# Patient Record
Sex: MALE | Race: WHITE | NOT HISPANIC OR LATINO | Employment: FULL TIME | URBAN - METROPOLITAN AREA
[De-identification: names, ages, dates, MRNs, and addresses within clinical notes are randomized per-mention and may not be internally consistent; named-entity substitution may affect disease eponyms.]

---

## 2017-01-16 ENCOUNTER — PATIENT MESSAGE (OUTPATIENT)
Dept: FAMILY MEDICINE | Facility: CLINIC | Age: 47
End: 2017-01-16

## 2017-01-16 DIAGNOSIS — F11.20 OPIOID DEPENDENCE ON AGONIST THERAPY: ICD-10-CM

## 2017-01-17 ENCOUNTER — PATIENT MESSAGE (OUTPATIENT)
Dept: FAMILY MEDICINE | Facility: CLINIC | Age: 47
End: 2017-01-17

## 2017-02-03 ENCOUNTER — OFFICE VISIT (OUTPATIENT)
Dept: FAMILY MEDICINE | Facility: CLINIC | Age: 47
End: 2017-02-03
Payer: COMMERCIAL

## 2017-02-03 VITALS
BODY MASS INDEX: 34.27 KG/M2 | HEART RATE: 75 BPM | RESPIRATION RATE: 16 BRPM | WEIGHT: 205.94 LBS | SYSTOLIC BLOOD PRESSURE: 120 MMHG | DIASTOLIC BLOOD PRESSURE: 88 MMHG | OXYGEN SATURATION: 96 %

## 2017-02-03 DIAGNOSIS — F11.20 OPIOID DEPENDENCE ON AGONIST THERAPY: Primary | ICD-10-CM

## 2017-02-03 PROCEDURE — 99999 PR PBB SHADOW E&M-EST. PATIENT-LVL III: CPT | Mod: PBBFAC,,,

## 2017-02-03 PROCEDURE — 99214 OFFICE O/P EST MOD 30 MIN: CPT | Mod: S$GLB,,,

## 2017-02-03 NOTE — PROGRESS NOTES
Subjective:       Patient ID: Jaime Henderson is a 46 y.o. male.    Chief Complaint: Drug / Alcohol Assessment    HPI The patient presents for medical management of opioid dependency. he is receiving maintenance therapy with Zubsolv. he is doing well and denies any craving or relapses. he denies any alcohol or substance abuse issues. he has had significant improvements in his relationships, social and occupational functioning. he claims to be compliant with treatment. I have reviewed his profile on the [facility] board of Pharmacy Prescription monitoring program website and he appears to be compliant. his last refill for Zubsolv  5.7 mg # 90 was on January 17, 2017. The patient understands the risks, benefits and alternatives associated with the medical management of opioid dependency with Suboxone and has signed a Behavior and Pain Agreement for the use of Controlled Drugs. He is regularly drug tested at work.     Review of Systems   Constitutional: Negative.    HENT: Negative.    Eyes: Negative.    Respiratory: Negative.  Negative for shortness of breath.    Cardiovascular: Negative.  Negative for chest pain.   Gastrointestinal: Negative.    Endocrine: Negative.    Genitourinary: Negative.    Musculoskeletal: Negative.    Skin: Negative.    Allergic/Immunologic: Negative.    Neurological: Negative.    Hematological: Negative.    Psychiatric/Behavioral: Negative.    All other systems reviewed and are negative.      Objective:      Vitals:    02/03/17 1014   BP: 120/88   Pulse: 75   Resp: 16     Physical Exam   Constitutional: He is oriented to person, place, and time. He appears well-developed and well-nourished. He is cooperative. No distress.   HENT:   Head: Normocephalic and atraumatic.   Right Ear: Hearing, tympanic membrane, external ear and ear canal normal.   Left Ear: Hearing, external ear and ear canal normal.   Nose: Nose normal.   Mouth/Throat: Oropharynx is clear and moist.   Eyes: Conjunctivae  are normal. Pupils are equal, round, and reactive to light.   Neck: Normal range of motion. Neck supple. No thyromegaly present.   Cardiovascular: Normal rate, regular rhythm, normal heart sounds and intact distal pulses.    Pulmonary/Chest: Effort normal and breath sounds normal. No respiratory distress.   Musculoskeletal: Normal range of motion. He exhibits no edema or tenderness.   Lymphadenopathy:     He has no cervical adenopathy.   Neurological: He is alert and oriented to person, place, and time. He has normal strength. Coordination and gait normal.   Skin: Skin is warm, dry and intact. No cyanosis. Nails show no clubbing.   Psychiatric: He has a normal mood and affect. His speech is normal and behavior is normal. Judgment and thought content normal. Cognition and memory are normal.   Vitals reviewed.      Assessment:       1. Opioid dependence on agonist therapy        Plan:       Opioid dependence on agonist therapy  -     buprenorphine-naloxone (ZUBSOLV) 5.7-1.4 mg Subl; Place 1 tablet under the tongue 3 (three) times daily. [id number]  Dispense: 90 tablet; Refill: 2      Return in about 3 months (around 5/3/2017).

## 2017-02-03 NOTE — MR AVS SNAPSHOT
[facility]  [address] [address]  Phone: [phone]  Fax: [phone]                  Jaime Henderson   2/3/2017 10:20 AM   Office Visit    Description:  Male : 1970   Provider:  Jak Frazier Jr., MD   Department:  [facility]           Reason for Visit     Drug / Alcohol Assessment           Diagnoses this Visit        Comments    Opioid dependence on agonist therapy    -  Primary            To Do List           Goals (5 Years of Data)     None      Follow-Up and Disposition     Return in about 3 months (around 5/3/2017).    Follow-up and Disposition History       These Medications        Disp Refills Start End    buprenorphine-naloxone (ZUBSOLV) 5.7-1.4 mg Subl 90 tablet 2 2017     Place 1 tablet under the tongue 3 (three) times daily. [id number] - Sublingual    Pharmacy: [facility] [address] Ph #: [phone]         [facility] On Call     [facility] On Call Nurse Care Line -  Assistance  Registered nurses in the [facility] On Call Center provide clinical advisement, health education, appointment booking, and other advisory services.  Call for this free service at [phone].             Medications           Message regarding Medications     Verify the changes and/or additions to your medication regime listed below are the same as discussed with your clinician today.  If any of these changes or additions are incorrect, please notify your healthcare provider.             Verify that the below list of medications is an accurate representation of the medications you are currently taking.  If none reported, the list may be blank. If incorrect, please contact your healthcare provider. Carry this list with you in case of emergency.           Current Medications     albuterol 90 mcg/actuation inhaler Inhale 2 puffs into the lungs every 6 (six) hours as needed for Wheezing.     "buprenorphine-naloxone (ZUBSOLV) 5.7-1.4 mg Subl Starting on Feb 16, 2017. Place 1 tablet under the tongue 3 (three) times daily. [id number]           Clinical Reference Information           Your Vitals Were     BP Pulse Resp Weight SpO2 BMI    120/88 (BP Location: Right arm, Patient Position: Sitting, BP Method: Manual) 75 16 93.4 kg (205 lb 14.6 oz) 96% 34.27 kg/m2      Blood Pressure          Most Recent Value    BP  120/88      Allergies as of 2/3/2017     No Known Allergies      Immunizations Administered on Date of Encounter - 2/3/2017     None      Instructions    You are being prescribed Buprenorphine/Naloxone for opioid dependency. Buprenorphine is an opioid medication. Buprenorphine is similar to other opioids such as morphine, codeine, and heroin however, it produces less euphoric ("high") effects and therefore may be easier to stop taking.  Naloxone blocks the effects of opioids such as morphine, codeine, and heroin. If buprenorphine and naloxone is injected, naloxone will block the effects of buprenorphine and lead to withdrawal symptoms in a person with opioid dependency. When administered under the tongue as directed, naloxone will not affect the actions of buprenorphine.    Buprenorphine and naloxone can cause drug dependence. This means that withdrawal symptoms may occur if you stop using the medicine too quickly. Withdrawal symptoms may also occur at the start of treatment due to dependence on another drug. Buprenorphine and naloxone is not for occasional ("as needed") use. Do not stop taking buprenorphine and naloxone without first talking to your doctor. Your doctor may want to gradually reduce the dose to avoid or minimize withdrawal symptoms.    Buprenorphine/Naloxone can only be prescribed by certain physicians who have had training in the field of addictive disorders and are certified by the Drug Enforcement Agency ([employer]MUN[employer]). Buprenorphine/Naloxone is a controlled substance and it's use is " "regulated by the [facility] Board of Medical Examiners, [facility] board and the [employer].     It is important that you are compliant in taking Buprenorphine/Naloxone strictly as prescribed. You have signed an agreement and am aware of the the risk of physical dependency, tolerance or addition to Buprenorphine/Naloxone. You have been informed of the side effects associated with this medication including constipation. You have been informed that certain medications in the benzodiazepine class including diazepam, lorazepam, alprazolam and similar medications should not be taken with Buprenorphine/Naloxone. You are aware that withdrawal symptoms including yawning, sweating, watery eyes, runny nose, anxiety, tremors, aching muscles, hot and cold flashes, goose bumps, abdominal pain, diarrhea and depression can occur if you suddenly discontinue or reduce the dose you are prescribed.     You have been informed that this medication should not be taken during pregnancy and you will inform me if you do plan to become or become pregnant during treatment.     You have agreed not to obtain this or other controlled medications from other physicians while taking Buprenorphine/Naloxone. You understand that I will review a history of all controlled medications prescribed to you on the [facility] Prescription Monitoring Program Database and that you must report any other controlled medication use to your physician. You will not take opioid pain medications while taking Buprenorphine/Naloxone. If you have an emergency or acute pain crises you will inform me as soon as possible.     You will come for your regularly scheduled appointments which must be at least every 90 days. You may schedule an appointment early and I can prescribe your medication in advance with a "do not fill until" date. If you have not been evaluated in the past 90 days your prescription will not be renewed. You should schedule an " appointment for your return visit before you leave the office today because if I am out of the office for vacation or other reasons there is no other physician that can prescribe this medication in my absence. If you schedule an appointment and I need to cancel that visit my office will contact you to make alternative arrangements.     You should not call for refills of this medications at night, weekends or Holidays without exception.     You have agreed not to use illegal drugs while taking Buprenorphine/Naloxone including marijuana. You understand that I can request a random drug test at any time and that you have agreed to provide this sample without advanced notice. Failure to comply with this request could result in termination of your treatment with Buprenorphine/Naloxone.     You understand that lost, stolen, misplaced, spilled medications will not be replaced. It is your responsibility to safeguard your medication. You understand that your medication cannot be filled early for any reason including work schedule conflicts, family emergencies, travel and vacations. You also have agreed not to give or sell Buprenorphine/Naloxone to anyone.     You have agreed to take this medication under your tongue properly.     If your insurance requires a prior authorization for this medication it could result in a delay of authorization for payment of your medication. I advise that you obtain information from your insurance and pharmacist to determine if you will require a prior authorization as far in advance as possible.     It is strongly recommended that you seek outpatient substance abuse treatment in addition to medical management with Buprenorphine/Naloxone. If your insurance doesn't provide this benefit or if you can't afford counseling you can seek treatment with your local AA/NA chapter or with your [facility]. Many insurances require that you are actively in outpatient treatment before  they will authorize payment for Buprenorphine/Naloxone.        Smoking Cessation     If you would like to quit smoking:   You may be eligible for free services if you are a [place] resident and started smoking cigarettes before September 1, 1988.  Call the Smoking Cessation Trust (SCT) toll free at (758) 622-7740 or (258) 071-7863.   Call 1-800-QUIT-NOW if you do not meet the above criteria.            Language Assistance Services     ATTENTION: Language assistance services are available, free of charge. Please call [phone].      ATENCIÓN: Si habla español, tiene a tilley disposición servicios gratuitos de asistencia lingüística. Llame al [phone].     CHÚ Ý: N?u b?n nói Ti?ng Vi?t, có các d?ch v? h? tr? ngôn ng? mi?n phí dành cho b?n. G?i s? [phone].         [facility] complies with applicable Federal civil rights laws and does not discriminate on the basis of race, color, national origin, age, disability, or sex.

## 2017-02-03 NOTE — PATIENT INSTRUCTIONS
"You are being prescribed Buprenorphine/Naloxone for opioid dependency. Buprenorphine is an opioid medication. Buprenorphine is similar to other opioids such as morphine, codeine, and heroin however, it produces less euphoric ("high") effects and therefore may be easier to stop taking.  Naloxone blocks the effects of opioids such as morphine, codeine, and heroin. If buprenorphine and naloxone is injected, naloxone will block the effects of buprenorphine and lead to withdrawal symptoms in a person with opioid dependency. When administered under the tongue as directed, naloxone will not affect the actions of buprenorphine.    Buprenorphine and naloxone can cause drug dependence. This means that withdrawal symptoms may occur if you stop using the medicine too quickly. Withdrawal symptoms may also occur at the start of treatment due to dependence on another drug. Buprenorphine and naloxone is not for occasional ("as needed") use. Do not stop taking buprenorphine and naloxone without first talking to your doctor. Your doctor may want to gradually reduce the dose to avoid or minimize withdrawal symptoms.    Buprenorphine/Naloxone can only be prescribed by certain physicians who have had training in the field of addictive disorders and are certified by the Drug Enforcement Agency ([employer]MUN[employer]). Buprenorphine/Naloxone is a controlled substance and it's use is regulated by the [place] Board of Medical Examiners, [place] [facility] board and the [employer].     It is important that you are compliant in taking Buprenorphine/Naloxone strictly as prescribed. You have signed an agreement and am aware of the the risk of physical dependency, tolerance or addition to Buprenorphine/Naloxone. You have been informed of the side effects associated with this medication including constipation. You have been informed that certain medications in the benzodiazepine class including diazepam, lorazepam, alprazolam and similar medications " "should not be taken with Buprenorphine/Naloxone. You are aware that withdrawal symptoms including yawning, sweating, watery eyes, runny nose, anxiety, tremors, aching muscles, hot and cold flashes, goose bumps, abdominal pain, diarrhea and depression can occur if you suddenly discontinue or reduce the dose you are prescribed.     You have been informed that this medication should not be taken during pregnancy and you will inform me if you do plan to become or become pregnant during treatment.     You have agreed not to obtain this or other controlled medications from other physicians while taking Buprenorphine/Naloxone. You understand that I will review a history of all controlled medications prescribed to you on the [facility] Prescription Monitoring Program Database and that you must report any other controlled medication use to your physician. You will not take opioid pain medications while taking Buprenorphine/Naloxone. If you have an emergency or acute pain crises you will inform me as soon as possible.     You will come for your regularly scheduled appointments which must be at least every 90 days. You may schedule an appointment early and I can prescribe your medication in advance with a "do not fill until" date. If you have not been evaluated in the past 90 days your prescription will not be renewed. You should schedule an appointment for your return visit before you leave the office today because if I am out of the office for vacation or other reasons there is no other physician that can prescribe this medication in my absence. If you schedule an appointment and I need to cancel that visit my office will contact you to make alternative arrangements.     You should not call for refills of this medications at night, weekends or Holidays without exception.     You have agreed not to use illegal drugs while taking Buprenorphine/Naloxone including marijuana. You understand that I can request a random " drug test at any time and that you have agreed to provide this sample without advanced notice. Failure to comply with this request could result in termination of your treatment with Buprenorphine/Naloxone.     You understand that lost, stolen, misplaced, spilled medications will not be replaced. It is your responsibility to safeguard your medication. You understand that your medication cannot be filled early for any reason including work schedule conflicts, family emergencies, travel and vacations. You also have agreed not to give or sell Buprenorphine/Naloxone to anyone.     You have agreed to take this medication under your tongue properly.     If your insurance requires a prior authorization for this medication it could result in a delay of authorization for payment of your medication. I advise that you obtain information from your insurance and pharmacist to determine if you will require a prior authorization as far in advance as possible.     It is strongly recommended that you seek outpatient substance abuse treatment in addition to medical management with Buprenorphine/Naloxone. If your insurance doesn't provide this benefit or if you can't afford counseling you can seek treatment with your local AA/NA chapter or with your [facility]. Many insurances require that you are actively in outpatient treatment before they will authorize payment for Buprenorphine/Naloxone.

## 2017-05-01 ENCOUNTER — OFFICE VISIT (OUTPATIENT)
Dept: FAMILY MEDICINE | Facility: CLINIC | Age: 47
End: 2017-05-01
Payer: COMMERCIAL

## 2017-05-01 VITALS
OXYGEN SATURATION: 95 % | DIASTOLIC BLOOD PRESSURE: 84 MMHG | HEART RATE: 70 BPM | WEIGHT: 216.06 LBS | SYSTOLIC BLOOD PRESSURE: 126 MMHG | RESPIRATION RATE: 16 BRPM | HEIGHT: 66 IN | BODY MASS INDEX: 34.72 KG/M2

## 2017-05-01 DIAGNOSIS — F11.20 OPIOID DEPENDENCE ON AGONIST THERAPY: ICD-10-CM

## 2017-05-01 PROCEDURE — 99999 PR PBB SHADOW E&M-EST. PATIENT-LVL III: CPT | Mod: PBBFAC,,,

## 2017-05-01 PROCEDURE — 99214 OFFICE O/P EST MOD 30 MIN: CPT | Mod: S$GLB,,,

## 2017-05-01 PROCEDURE — 1160F RVW MEDS BY RX/DR IN RCRD: CPT | Mod: S$GLB,,,

## 2017-05-01 NOTE — PATIENT INSTRUCTIONS
"You are being prescribed Buprenorphine/Naloxone for opioid dependency. Buprenorphine is an opioid medication. Buprenorphine is similar to other opioids such as morphine, codeine, and heroin however, it produces less euphoric ("high") effects and therefore may be easier to stop taking.  Naloxone blocks the effects of opioids such as morphine, codeine, and heroin. If buprenorphine and naloxone is injected, naloxone will block the effects of buprenorphine and lead to withdrawal symptoms in a person with opioid dependency. When administered under the tongue as directed, naloxone will not affect the actions of buprenorphine.    Buprenorphine and naloxone can cause drug dependence. This means that withdrawal symptoms may occur if you stop using the medicine too quickly. Withdrawal symptoms may also occur at the start of treatment due to dependence on another drug. Buprenorphine and naloxone is not for occasional ("as needed") use. Do not stop taking buprenorphine and naloxone without first talking to your doctor. Your doctor may want to gradually reduce the dose to avoid or minimize withdrawal symptoms.    Buprenorphine/Naloxone can only be prescribed by certain physicians who have had training in the field of addictive disorders and are certified by the Drug Enforcement Agency ([employer]). Buprenorphine/Naloxone is a controlled substance and it's use is regulated by the [place] Board of Medical Examiners, [place] [facility] board and the [employer].     It is important that you are compliant in taking Buprenorphine/Naloxone strictly as prescribed. You have signed an agreement and am aware of the the risk of physical dependency, tolerance or addition to Buprenorphine/Naloxone. You have been informed of the side effects associated with this medication including constipation. You have been informed that certain medications in the benzodiazepine class including diazepam, lorazepam, alprazolam and similar medications " "should not be taken with Buprenorphine/Naloxone. You are aware that withdrawal symptoms including yawning, sweating, watery eyes, runny nose, anxiety, tremors, aching muscles, hot and cold flashes, goose bumps, abdominal pain, diarrhea and depression can occur if you suddenly discontinue or reduce the dose you are prescribed.     You have been informed that this medication should not be taken during pregnancy and you will inform me if you do plan to become or become pregnant during treatment.     You have agreed not to obtain this or other controlled medications from other physicians while taking Buprenorphine/Naloxone. You understand that I will review a history of all controlled medications prescribed to you on the [facility] Prescription Monitoring Program Database and that you must report any other controlled medication use to your physician. You will not take opioid pain medications while taking Buprenorphine/Naloxone. If you have an emergency or acute pain crises you will inform me as soon as possible.     You will come for your regularly scheduled appointments which must be at least every 90 days. You may schedule an appointment early and I can prescribe your medication in advance with a "do not fill until" date. If you have not been evaluated in the past 90 days your prescription will not be renewed. You should schedule an appointment for your return visit before you leave the office today because if I am out of the office for vacation or other reasons there is no other physician that can prescribe this medication in my absence. If you schedule an appointment and I need to cancel that visit my office will contact you to make alternative arrangements.     You should not call for refills of this medications at night, weekends or Holidays without exception.     You have agreed not to use illegal drugs while taking Buprenorphine/Naloxone including marijuana. You understand that I can request a random " drug test at any time and that you have agreed to provide this sample without advanced notice. Failure to comply with this request could result in termination of your treatment with Buprenorphine/Naloxone.     You understand that lost, stolen, misplaced, spilled medications will not be replaced. It is your responsibility to safeguard your medication. You understand that your medication cannot be filled early for any reason including work schedule conflicts, family emergencies, travel and vacations. You also have agreed not to give or sell Buprenorphine/Naloxone to anyone.     You have agreed to take this medication under your tongue properly.     If your insurance requires a prior authorization for this medication it could result in a delay of authorization for payment of your medication. I advise that you obtain information from your insurance and pharmacist to determine if you will require a prior authorization as far in advance as possible.     It is strongly recommended that you seek outpatient substance abuse treatment in addition to medical management with Buprenorphine/Naloxone. If your insurance doesn't provide this benefit or if you can't afford counseling you can seek treatment with your local AA/NA chapter or with your [facility]. Many insurances require that you are actively in outpatient treatment before they will authorize payment for Buprenorphine/Naloxone.

## 2017-05-01 NOTE — PROGRESS NOTES
Subjective:       Patient ID: Jaime Henderson is a 46 y.o. male.    Chief Complaint: Drug / Alcohol Assessment    HPI  The patient presents for medical management of opioid dependency. he is receiving maintenance therapy with Zubsolv. he is doing well and denies any craving or relapses. he denies any alcohol or substance abuse issues. he has had significant improvements in his relationships, social and occupational functioning. he claims to be compliant with treatment. I have reviewed his profile on the [facility] board of Pharmacy Prescription monitoring program website and he appears to be compliant. his last refill for Zubsolv  5.7 mg # 90 was on April 17, 2017. The patient understands the risks, benefits and alternatives associated with the medical management of opioid dependency with Suboxone and has signed a Behavior and Pain Agreement for the use of Controlled Drugs.          Review of Systems   Constitutional: Negative.    Respiratory: Negative.  Negative for shortness of breath.    Cardiovascular: Negative for chest pain.   Psychiatric/Behavioral: Negative.    All other systems reviewed and are negative.      Objective:      Vitals:    05/01/17 0947   BP: 126/84   Pulse: 70   Resp: 16     Physical Exam   Constitutional: He is oriented to person, place, and time. He appears well-developed and well-nourished. He is cooperative. No distress.   HENT:   Head: Normocephalic and atraumatic.   Right Ear: Hearing, tympanic membrane, external ear and ear canal normal.   Left Ear: Hearing, external ear and ear canal normal.   Nose: Nose normal.   Mouth/Throat: Oropharynx is clear and moist.   Eyes: Conjunctivae are normal. Pupils are equal, round, and reactive to light.   Neck: Normal range of motion. Neck supple. No thyromegaly present.   Cardiovascular: Normal rate, regular rhythm, normal heart sounds and intact distal pulses.    Pulmonary/Chest: Effort normal and breath sounds normal. No respiratory distress.    Musculoskeletal: Normal range of motion. He exhibits no edema or tenderness.   Lymphadenopathy:     He has no cervical adenopathy.   Neurological: He is alert and oriented to person, place, and time. He has normal strength. Coordination and gait normal.   Skin: Skin is warm, dry and intact. No cyanosis. Nails show no clubbing.   Psychiatric: He has a normal mood and affect. His speech is normal and behavior is normal. Judgment and thought content normal. Cognition and memory are normal.   Vitals reviewed.      Assessment:       1. Opioid dependence on agonist therapy        Plan:       Opioid dependence on agonist therapy  -     buprenorphine-naloxone (ZUBSOLV) 5.7-1.4 mg Subl; Place 1 tablet under the tongue 3 (three) times daily. [id number]  Dispense: 90 tablet; Refill: 2      Return in about 3 months (around 8/1/2017).

## 2017-05-01 NOTE — MR AVS SNAPSHOT
[facility]  [address] [place] [address]  Phone: [phone]  Fax: [phone]                  Jaime Henderson   2017 9:40 AM   Office Visit    Description:  Male : 1970   Provider:  Jak Frazier Jr., MD   Department:  [facility]           Reason for Visit     Drug / Alcohol Assessment           Diagnoses this Visit        Comments    Opioid dependence on agonist therapy                To Do List           Goals (5 Years of Data)     None      Follow-Up and Disposition     Return in about 3 months (around 2017).    Follow-up and Disposition History       These Medications        Disp Refills Start End    buprenorphine-naloxone (ZUBSOLV) 5.7-1.4 mg Subl 90 tablet 2 2017     Place 1 tablet under the tongue 3 (three) times daily. [id number] - Sublingual    Pharmacy: [facility] [address] Ph #: [phone]         [facility]     [facility] Care Line -  Assistance  Unless otherwise directed by your provider, please contact [facility], our nurse care line that is available for  assistance.     Registered nurses in the [facility] provide: appointment scheduling, clinical advisement, health education, and other advisory services.  Call: [phone] (toll free)               Medications           Message regarding Medications     Verify the changes and/or additions to your medication regime listed below are the same as discussed with your clinician today.  If any of these changes or additions are incorrect, please notify your healthcare provider.             Verify that the below list of medications is an accurate representation of the medications you are currently taking.  If none reported, the list may be blank. If incorrect, please contact your healthcare provider. Carry this list with you in case of emergency.           Current Medications     albuterol  "90 mcg/actuation inhaler Inhale 2 puffs into the lungs every 6 (six) hours as needed for Wheezing.    buprenorphine-naloxone (ZUBSOLV) 5.7-1.4 mg Subl Place 1 tablet under the tongue 3 (three) times daily. [id number]           Clinical Reference Information           Your Vitals Were     BP Pulse Resp Height Weight SpO2    126/84 (BP Location: Right arm, Patient Position: Sitting, BP Method: Manual) 70 16 5' 6" (1.676 m) 98 kg (216 lb 0.8 oz) 95%    BMI                34.87 kg/m2          Blood Pressure          Most Recent Value    BP  126/84      Allergies as of 5/1/2017     No Known Allergies      Immunizations Administered on Date of Encounter - 5/1/2017     None      Instructions    You are being prescribed Buprenorphine/Naloxone for opioid dependency. Buprenorphine is an opioid medication. Buprenorphine is similar to other opioids such as morphine, codeine, and heroin however, it produces less euphoric ("high") effects and therefore may be easier to stop taking.  Naloxone blocks the effects of opioids such as morphine, codeine, and heroin. If buprenorphine and naloxone is injected, naloxone will block the effects of buprenorphine and lead to withdrawal symptoms in a person with opioid dependency. When administered under the tongue as directed, naloxone will not affect the actions of buprenorphine.    Buprenorphine and naloxone can cause drug dependence. This means that withdrawal symptoms may occur if you stop using the medicine too quickly. Withdrawal symptoms may also occur at the start of treatment due to dependence on another drug. Buprenorphine and naloxone is not for occasional ("as needed") use. Do not stop taking buprenorphine and naloxone without first talking to your doctor. Your doctor may want to gradually reduce the dose to avoid or minimize withdrawal symptoms.    Buprenorphine/Naloxone can only be prescribed by certain physicians who have had training in the field of addictive disorders and are " "certified by the Drug Enforcement Agency (EDMUND). Buprenorphine/Naloxone is a controlled substance and it's use is regulated by the [facility] Board of Medical Examiners, [place] [facility] board and the [employer].     It is important that you are compliant in taking Buprenorphine/Naloxone strictly as prescribed. You have signed an agreement and am aware of the the risk of physical dependency, tolerance or addition to Buprenorphine/Naloxone. You have been informed of the side effects associated with this medication including constipation. You have been informed that certain medications in the benzodiazepine class including diazepam, lorazepam, alprazolam and similar medications should not be taken with Buprenorphine/Naloxone. You are aware that withdrawal symptoms including yawning, sweating, watery eyes, runny nose, anxiety, tremors, aching muscles, hot and cold flashes, goose bumps, abdominal pain, diarrhea and depression can occur if you suddenly discontinue or reduce the dose you are prescribed.     You have been informed that this medication should not be taken during pregnancy and you will inform me if you do plan to become or become pregnant during treatment.     You have agreed not to obtain this or other controlled medications from other physicians while taking Buprenorphine/Naloxone. You understand that I will review a history of all controlled medications prescribed to you on the [facility] Prescription Monitoring Program Database and that you must report any other controlled medication use to your physician. You will not take opioid pain medications while taking Buprenorphine/Naloxone. If you have an emergency or acute pain crises you will inform me as soon as possible.     You will come for your regularly scheduled appointments which must be at least every 90 days. You may schedule an appointment early and I can prescribe your medication in advance with a "do not fill until" date. If you " have not been evaluated in the past 90 days your prescription will not be renewed. You should schedule an appointment for your return visit before you leave the office today because if I am out of the office for vacation or other reasons there is no other physician that can prescribe this medication in my absence. If you schedule an appointment and I need to cancel that visit my office will contact you to make alternative arrangements.     You should not call for refills of this medications at night, weekends or Holidays without exception.     You have agreed not to use illegal drugs while taking Buprenorphine/Naloxone including marijuana. You understand that I can request a random drug test at any time and that you have agreed to provide this sample without advanced notice. Failure to comply with this request could result in termination of your treatment with Buprenorphine/Naloxone.     You understand that lost, stolen, misplaced, spilled medications will not be replaced. It is your responsibility to safeguard your medication. You understand that your medication cannot be filled early for any reason including work schedule conflicts, family emergencies, travel and vacations. You also have agreed not to give or sell Buprenorphine/Naloxone to anyone.     You have agreed to take this medication under your tongue properly.     If your insurance requires a prior authorization for this medication it could result in a delay of authorization for payment of your medication. I advise that you obtain information from your insurance and pharmacist to determine if you will require a prior authorization as far in advance as possible.     It is strongly recommended that you seek outpatient substance abuse treatment in addition to medical management with Buprenorphine/Naloxone. If your insurance doesn't provide this benefit or if you can't afford counseling you can seek treatment with your local AA/NA chapter or with your [place]  [facility]. Many insurances require that you are actively in outpatient treatment before they will authorize payment for Buprenorphine/Naloxone.        Smoking Cessation     If you would like to quit smoking:   You may be eligible for free services if you are a [place] resident and started smoking cigarettes before September 1, 1988.  Call the Smoking Cessation Trust (SCT) toll free at (705) 678-4319 or (412) 881-7588.   Call 1-800-QUIT-NOW if you do not meet the above criteria.   Contact us via email: [email or web address]   View our website for more information: [email or web address]        Language Assistance Services     ATTENTION: Language assistance services are available, free of charge. Please call [phone].      ATENCIÓN: Si irwin garber, tiene a tilley disposición servicios gratuitos de asistencia lingüística. Llame al [phone].     CHÚ Ý: N?u b?n nói Ti?ng Vi?t, có các d?ch v? h? tr? ngôn ng? mi?n phí dành cho b?n. G?i s? [phone].         [facility] complies with applicable Federal civil rights laws and does not discriminate on the basis of race, color, national origin, age, disability, or sex.

## 2017-07-03 ENCOUNTER — PATIENT MESSAGE (OUTPATIENT)
Dept: FAMILY MEDICINE | Facility: CLINIC | Age: 47
End: 2017-07-03

## 2017-07-03 RX ORDER — AZITHROMYCIN 250 MG/1
250 TABLET, FILM COATED ORAL DAILY
Qty: 6 TABLET | Refills: 0 | Status: SHIPPED | OUTPATIENT
Start: 2017-07-03 | End: 2017-07-08

## 2017-07-24 ENCOUNTER — OFFICE VISIT (OUTPATIENT)
Dept: FAMILY MEDICINE | Facility: CLINIC | Age: 47
End: 2017-07-24
Payer: COMMERCIAL

## 2017-07-24 VITALS
HEIGHT: 65 IN | DIASTOLIC BLOOD PRESSURE: 80 MMHG | WEIGHT: 223.56 LBS | HEART RATE: 82 BPM | SYSTOLIC BLOOD PRESSURE: 140 MMHG | BODY MASS INDEX: 37.25 KG/M2

## 2017-07-24 DIAGNOSIS — F11.20 OPIOID DEPENDENCE ON AGONIST THERAPY: ICD-10-CM

## 2017-07-24 LAB
AMPHET+METHAMPHET UR QL: NEGATIVE
BARBITURATES UR QL SCN>200 NG/ML: NEGATIVE
BENZODIAZ UR QL SCN>200 NG/ML: NEGATIVE
BZE UR QL SCN: NEGATIVE
CANNABINOIDS UR QL SCN: NEGATIVE
CREAT UR-MCNC: 117 MG/DL
ETHANOL UR-MCNC: <10 MG/DL
METHADONE UR QL SCN>300 NG/ML: NEGATIVE
OPIATES UR QL SCN: NEGATIVE
PCP UR QL SCN>25 NG/ML: NEGATIVE
TOXICOLOGY INFORMATION: NORMAL

## 2017-07-24 PROCEDURE — 80307 DRUG TEST PRSMV CHEM ANLYZR: CPT

## 2017-07-24 PROCEDURE — 99214 OFFICE O/P EST MOD 30 MIN: CPT | Mod: S$GLB,,,

## 2017-07-24 PROCEDURE — 80348 DRUG SCREENING BUPRENORPHINE: CPT

## 2017-07-24 PROCEDURE — 99999 PR PBB SHADOW E&M-EST. PATIENT-LVL III: CPT | Mod: PBBFAC,,,

## 2017-07-24 NOTE — PROGRESS NOTES
Subjective:       Patient ID: Jaime Henderson is a 46 y.o. male.    Chief Complaint: Drug / Alcohol Assessment    HPI The patient presents for medical management of opioid dependency. he is receiving maintenance therapy with Zubsolv. he is doing well and denies any craving or relapses. he denies any alcohol or substance abuse issues. he has had significant improvements in his relationships, social and occupational functioning. he claims to be compliant with treatment. I have reviewed his profile on the [facility] board of Pharmacy Prescription monitoring program website and he appears to be compliant. his last refill for Zubsolv 5.7 mg # 90 was on July 14, 2017. The patient understands the risks, benefits and alternatives associated with the medical management of opioid dependency with Zubsolv and has signed a Behavior and Pain Agreement for the use of Controlled Drugs.     Review of Systems   Constitutional: Negative.    Respiratory: Negative.  Negative for shortness of breath.    Cardiovascular: Negative for chest pain.   Psychiatric/Behavioral: Negative.    All other systems reviewed and are negative.      Objective:      Vitals:    07/24/17 0827   BP: (!) 140/80   Pulse: 82     Physical Exam   Constitutional: He is oriented to person, place, and time. He appears well-developed and well-nourished. He is cooperative. No distress.   HENT:   Head: Normocephalic and atraumatic.   Right Ear: Hearing, tympanic membrane, external ear and ear canal normal.   Left Ear: Hearing, external ear and ear canal normal.   Nose: Nose normal.   Mouth/Throat: Oropharynx is clear and moist.   Eyes: Conjunctivae are normal. Pupils are equal, round, and reactive to light.   Neck: Normal range of motion. Neck supple. No thyromegaly present.   Cardiovascular: Normal rate, regular rhythm, normal heart sounds and intact distal pulses.    Pulmonary/Chest: Effort normal and breath sounds normal. No respiratory distress.    Musculoskeletal: Normal range of motion. He exhibits no edema or tenderness.   Lymphadenopathy:     He has no cervical adenopathy.   Neurological: He is alert and oriented to person, place, and time. He has normal strength. Coordination and gait normal.   Skin: Skin is warm, dry and intact. No cyanosis. Nails show no clubbing.   Psychiatric: He has a normal mood and affect. His speech is normal and behavior is normal. Judgment and thought content normal. Cognition and memory are normal.   Vitals reviewed.      Assessment:       1. Opioid dependence on agonist therapy        Plan:       Opioid dependence on agonist therapy  -     buprenorphine-naloxone (ZUBSOLV) 5.7-1.4 mg Subl; Place 1 tablet under the tongue 3 (three) times daily. [id number]  Dispense: 90 tablet; Refill: 2  -     Buprenorphine and Norbuprenorphine,urine  -     Toxicology screen, urine      Return in about 3 months (around 10/24/2017).

## 2017-07-24 NOTE — PATIENT INSTRUCTIONS
"You are being prescribed Buprenorphine/Naloxone for opioid dependency. Buprenorphine is an opioid medication. Buprenorphine is similar to other opioids such as morphine, codeine, and heroin however, it produces less euphoric ("high") effects and therefore may be easier to stop taking.  Naloxone blocks the effects of opioids such as morphine, codeine, and heroin. If buprenorphine and naloxone is injected, naloxone will block the effects of buprenorphine and lead to withdrawal symptoms in a person with opioid dependency. When administered under the tongue as directed, naloxone will not affect the actions of buprenorphine.    Buprenorphine and naloxone can cause drug dependence. This means that withdrawal symptoms may occur if you stop using the medicine too quickly. Withdrawal symptoms may also occur at the start of treatment due to dependence on another drug. Buprenorphine and naloxone is not for occasional ("as needed") use. Do not stop taking buprenorphine and naloxone without first talking to your doctor. Your doctor may want to gradually reduce the dose to avoid or minimize withdrawal symptoms.    Buprenorphine/Naloxone can only be prescribed by certain physicians who have had training in the field of addictive disorders and are certified by the Drug Enforcement Agency (EDMUND). Buprenorphine/Naloxone is a controlled substance and it's use is regulated by the [place] Board of Medical Examiners, [place] [facility] board and the [employer].     It is important that you are compliant in taking Buprenorphine/Naloxone strictly as prescribed. You have signed an agreement and am aware of the the risk of physical dependency, tolerance or addition to Buprenorphine/Naloxone. You have been informed of the side effects associated with this medication including constipation. You have been informed that certain medications in the benzodiazepine class including diazepam, lorazepam, alprazolam and similar medications " "should not be taken with Buprenorphine/Naloxone. You are aware that withdrawal symptoms including yawning, sweating, watery eyes, runny nose, anxiety, tremors, aching muscles, hot and cold flashes, goose bumps, abdominal pain, diarrhea and depression can occur if you suddenly discontinue or reduce the dose you are prescribed.     You have been informed that this medication should not be taken during pregnancy and you will inform me if you do plan to become or become pregnant during treatment.     You have agreed not to obtain this or other controlled medications from other physicians while taking Buprenorphine/Naloxone. You understand that I will review a history of all controlled medications prescribed to you on the [facility] Prescription Monitoring Program Database and that you must report any other controlled medication use to your physician. You will not take opioid pain medications while taking Buprenorphine/Naloxone. If you have an emergency or acute pain crises you will inform me as soon as possible.     You will come for your regularly scheduled appointments which must be at least every 90 days. You may schedule an appointment early and I can prescribe your medication in advance with a "do not fill until" date. If you have not been evaluated in the past 90 days your prescription will not be renewed. You should schedule an appointment for your return visit before you leave the office today because if I am out of the office for vacation or other reasons there is no other physician that can prescribe this medication in my absence. If you schedule an appointment and I need to cancel that visit my office will contact you to make alternative arrangements.     You should not call for refills of this medications at night, weekends or Holidays without exception.     You have agreed not to use illegal drugs while taking Buprenorphine/Naloxone including marijuana. You understand that I can request a random " drug test at any time and that you have agreed to provide this sample without advanced notice. Failure to comply with this request could result in termination of your treatment with Buprenorphine/Naloxone.     You understand that lost, stolen, misplaced, spilled medications will not be replaced. It is your responsibility to safeguard your medication. You understand that your medication cannot be filled early for any reason including work schedule conflicts, family emergencies, travel and vacations. You also have agreed not to give or sell Buprenorphine/Naloxone to anyone.     You have agreed to take this medication under your tongue properly.     If your insurance requires a prior authorization for this medication it could result in a delay of authorization for payment of your medication. I advise that you obtain information from your insurance and pharmacist to determine if you will require a prior authorization as far in advance as possible.     It is strongly recommended that you seek outpatient substance abuse treatment in addition to medical management with Buprenorphine/Naloxone. If your insurance doesn't provide this benefit or if you can't afford counseling you can seek treatment with your local AA/NA chapter or with your [place] [facility]. Many insurances require that you are actively in outpatient treatment before they will authorize payment for Buprenorphine/Naloxone.

## 2017-07-25 LAB — BUPRENORPHINE UR-MCNC: NORMAL NG/ML

## 2017-07-27 LAB
BUPRENORPHINE CONFIRMATION URINE: 252.3 NG/ML
NORBUPRENORPHINE CONFIRMATION URINE: 597.4 NG/ML

## 2017-08-29 ENCOUNTER — PATIENT MESSAGE (OUTPATIENT)
Dept: FAMILY MEDICINE | Facility: CLINIC | Age: 47
End: 2017-08-29

## 2017-08-29 RX ORDER — AMOXICILLIN AND CLAVULANATE POTASSIUM 875; 125 MG/1; MG/1
1 TABLET, FILM COATED ORAL EVERY 12 HOURS
Qty: 20 TABLET | Refills: 1 | Status: SHIPPED | OUTPATIENT
Start: 2017-08-29 | End: 2017-12-28 | Stop reason: ALTCHOICE

## 2017-11-14 ENCOUNTER — PATIENT MESSAGE (OUTPATIENT)
Dept: FAMILY MEDICINE | Facility: CLINIC | Age: 47
End: 2017-11-14

## 2017-11-16 ENCOUNTER — PATIENT MESSAGE (OUTPATIENT)
Dept: FAMILY MEDICINE | Facility: CLINIC | Age: 47
End: 2017-11-16

## 2017-11-16 DIAGNOSIS — F11.20 OPIOID DEPENDENCE ON AGONIST THERAPY: ICD-10-CM

## 2017-12-12 ENCOUNTER — TELEPHONE (OUTPATIENT)
Dept: FAMILY MEDICINE | Facility: CLINIC | Age: 47
End: 2017-12-12

## 2017-12-28 ENCOUNTER — OFFICE VISIT (OUTPATIENT)
Dept: FAMILY MEDICINE | Facility: CLINIC | Age: 47
End: 2017-12-28
Payer: COMMERCIAL

## 2017-12-28 VITALS
RESPIRATION RATE: 18 BRPM | DIASTOLIC BLOOD PRESSURE: 70 MMHG | BODY MASS INDEX: 36.44 KG/M2 | HEART RATE: 82 BPM | OXYGEN SATURATION: 96 % | SYSTOLIC BLOOD PRESSURE: 128 MMHG | WEIGHT: 218.69 LBS | HEIGHT: 65 IN

## 2017-12-28 DIAGNOSIS — F11.20 OPIOID DEPENDENCE ON AGONIST THERAPY: ICD-10-CM

## 2017-12-28 PROCEDURE — 99999 PR PBB SHADOW E&M-EST. PATIENT-LVL III: CPT | Mod: PBBFAC,,,

## 2017-12-28 PROCEDURE — 99214 OFFICE O/P EST MOD 30 MIN: CPT | Mod: S$GLB,,,

## 2017-12-28 NOTE — PROGRESS NOTES
Subjective:       Patient ID: Jaime Henderson is a 47 y.o. male.    Chief Complaint: Drug / Alcohol Assessment (3 month follow up) and Medication Refill     HPI The patient presents for medical management of opioid dependency. he is receiving maintenance therapy with Zubsolv. he is doing well and denies any craving or relapses. he denies any alcohol or substance abuse issues. he has had significant improvements in his relationships, social and occupational functioning. he claims to be compliant with treatment. I have reviewed his profile on the [facility] board of Pharmacy Prescription monitoring program website and he appears to be compliant. his last refill for Zubsolv 5.7 mg # 90 was on November 16, 2017. The patient understands the risks, benefits and alternatives associated with the medical management of opioid dependency with Suboxone and has signed a Behavior and Pain Agreement for the use of Controlled Drugs.     Review of Systems   Constitutional: Negative.    Respiratory: Negative.  Negative for shortness of breath.    Cardiovascular: Negative for chest pain.   Psychiatric/Behavioral: Negative.    All other systems reviewed and are negative.      Objective:      Vitals:    12/28/17 1017   BP: 128/70   Pulse: 82   Resp: 18     Physical Exam   Constitutional: He appears well-developed and well-nourished.  Non-toxic appearance. He does not have a sickly appearance. He does not appear ill. No distress.   Not yawning.    HENT:   Head: Normocephalic and atraumatic.   Eyes: Conjunctivae are normal. Pupils are equal, round, and reactive to light.   Not dilated   Neck: Neck supple. No thyroid mass and no thyromegaly present.   Cardiovascular: Normal rate, regular rhythm, normal heart sounds and normal pulses.    Pulmonary/Chest: Effort normal and breath sounds normal.   Musculoskeletal: Normal range of motion.   Neurological: He is alert. He has normal strength. Coordination and gait normal.   Skin: Skin is  warm and dry. He is not diaphoretic. No pallor.   No goosebumps.    Psychiatric: He has a normal mood and affect. His speech is normal and behavior is normal. Judgment and thought content normal. His mood appears not anxious. His affect is not angry, not blunt, not labile and not inappropriate. Cognition and memory are normal. He does not exhibit a depressed mood.   Nursing note and vitals reviewed.      Assessment:       1. Opioid dependence on agonist therapy        Plan:       Opioid dependence on agonist therapy  -     Discontinue: buprenorphine-naloxone (ZUBSOLV) 5.7-1.4 mg Subl; Place 1 tablet under the tongue 3 (three) times daily. [id number]  Dispense: 90 tablet; Refill: 2  -     buprenorphine-naloxone (ZUBSOLV) 5.7-1.4 mg Subl; Place 1 tablet under the tongue 3 (three) times daily. [id number]  Dispense: 90 tablet; Refill: 2      Return in about 3 months (around 3/28/2018).

## 2017-12-28 NOTE — PATIENT INSTRUCTIONS
"You are being prescribed Buprenorphine/Naloxone for opioid dependency. Buprenorphine is an opioid medication. Buprenorphine is similar to other opioids such as morphine, codeine, and heroin however, it produces less euphoric ("high") effects and therefore may be easier to stop taking.  Naloxone blocks the effects of opioids such as morphine, codeine, and heroin. If buprenorphine and naloxone is injected, naloxone will block the effects of buprenorphine and lead to withdrawal symptoms in a person with opioid dependency. When administered under the tongue as directed, naloxone will not affect the actions of buprenorphine.    Buprenorphine and naloxone can cause drug dependence. This means that withdrawal symptoms may occur if you stop using the medicine too quickly. Withdrawal symptoms may also occur at the start of treatment due to dependence on another drug. Buprenorphine and naloxone is not for occasional ("as needed") use. Do not stop taking buprenorphine and naloxone without first talking to your doctor. Your doctor may want to gradually reduce the dose to avoid or minimize withdrawal symptoms.    Buprenorphine/Naloxone can only be prescribed by certain physicians who have had training in the field of addictive disorders and are certified by the Drug Enforcement Agency (EDMUND). Buprenorphine/Naloxone is a controlled substance and it's use is regulated by the [place] Board of Medical Examiners, [place] [facility] board and the [employer].     It is important that you are compliant in taking Buprenorphine/Naloxone strictly as prescribed. You have signed an agreement and am aware of the the risk of physical dependency, tolerance or addition to Buprenorphine/Naloxone. You have been informed of the side effects associated with this medication including constipation. You have been informed that certain medications in the benzodiazepine class including diazepam, lorazepam, alprazolam and similar medications " "should not be taken with Buprenorphine/Naloxone. You are aware that withdrawal symptoms including yawning, sweating, watery eyes, runny nose, anxiety, tremors, aching muscles, hot and cold flashes, goose bumps, abdominal pain, diarrhea and depression can occur if you suddenly discontinue or reduce the dose you are prescribed.     You have been informed that this medication should not be taken during pregnancy and you will inform me if you do plan to become or become pregnant during treatment.     You have agreed not to obtain this or other controlled medications from other physicians while taking Buprenorphine/Naloxone. You understand that I will review a history of all controlled medications prescribed to you on the [facility] Prescription Monitoring Program Database and that you must report any other controlled medication use to your physician. You will not take opioid pain medications while taking Buprenorphine/Naloxone. If you have an emergency or acute pain crises you will inform me as soon as possible.     You will come for your regularly scheduled appointments which must be at least every 90 days. You may schedule an appointment early and I can prescribe your medication in advance with a "do not fill until" date. If you have not been evaluated in the past 90 days your prescription will not be renewed. You should schedule an appointment for your return visit before you leave the office today because if I am out of the office for vacation or other reasons there is no other physician that can prescribe this medication in my absence. If you schedule an appointment and I need to cancel that visit my office will contact you to make alternative arrangements.     You should not call for refills of this medications at night, weekends or Holidays without exception.     You have agreed not to use illegal drugs while taking Buprenorphine/Naloxone including marijuana. You understand that I can request a random " drug test at any time and that you have agreed to provide this sample without advanced notice. Failure to comply with this request could result in termination of your treatment with Buprenorphine/Naloxone.     You understand that lost, stolen, misplaced, spilled medications will not be replaced. It is your responsibility to safeguard your medication. You understand that your medication cannot be filled early for any reason including work schedule conflicts, family emergencies, travel and vacations. You also have agreed not to give or sell Buprenorphine/Naloxone to anyone.     You have agreed to take this medication under your tongue properly.     If your insurance requires a prior authorization for this medication it could result in a delay of authorization for payment of your medication. I advise that you obtain information from your insurance and pharmacist to determine if you will require a prior authorization as far in advance as possible.     It is strongly recommended that you seek outpatient substance abuse treatment in addition to medical management with Buprenorphine/Naloxone. If your insurance doesn't provide this benefit or if you can't afford counseling you can seek treatment with your local AA/NA chapter or with your [facility]. Many insurances require that you are actively in outpatient treatment before they will authorize payment for Buprenorphine/Naloxone.

## 2018-03-22 ENCOUNTER — OFFICE VISIT (OUTPATIENT)
Dept: FAMILY MEDICINE | Facility: CLINIC | Age: 48
End: 2018-03-22
Payer: COMMERCIAL

## 2018-03-22 VITALS
DIASTOLIC BLOOD PRESSURE: 80 MMHG | SYSTOLIC BLOOD PRESSURE: 120 MMHG | BODY MASS INDEX: 35.57 KG/M2 | HEART RATE: 78 BPM | WEIGHT: 213.5 LBS | OXYGEN SATURATION: 98 % | HEIGHT: 65 IN

## 2018-03-22 DIAGNOSIS — F11.20 OPIOID DEPENDENCE ON AGONIST THERAPY: Primary | ICD-10-CM

## 2018-03-22 DIAGNOSIS — F41.9 ANXIETY: ICD-10-CM

## 2018-03-22 PROCEDURE — 99999 PR PBB SHADOW E&M-EST. PATIENT-LVL III: CPT | Mod: PBBFAC,,,

## 2018-03-22 PROCEDURE — 99214 OFFICE O/P EST MOD 30 MIN: CPT | Mod: S$GLB,,,

## 2018-03-22 RX ORDER — DULOXETIN HYDROCHLORIDE 60 MG/1
60 CAPSULE, DELAYED RELEASE ORAL DAILY
Qty: 30 CAPSULE | Refills: 11 | Status: SHIPPED | OUTPATIENT
Start: 2018-03-22 | End: 2018-04-24 | Stop reason: SDUPTHER

## 2018-03-22 NOTE — PATIENT INSTRUCTIONS
"You are being prescribed Buprenorphine/Naloxone for opioid dependency. Buprenorphine is an opioid medication. Buprenorphine is similar to other opioids such as morphine, codeine, and heroin however, it produces less euphoric ("high") effects and therefore may be easier to stop taking.  Naloxone blocks the effects of opioids such as morphine, codeine, and heroin. If buprenorphine and naloxone is injected, naloxone will block the effects of buprenorphine and lead to withdrawal symptoms in a person with opioid dependency. When administered under the tongue as directed, naloxone will not affect the actions of buprenorphine.    Buprenorphine and naloxone can cause drug dependence. This means that withdrawal symptoms may occur if you stop using the medicine too quickly. Withdrawal symptoms may also occur at the start of treatment due to dependence on another drug. Buprenorphine and naloxone is not for occasional ("as needed") use. Do not stop taking buprenorphine and naloxone without first talking to your doctor. Your doctor may want to gradually reduce the dose to avoid or minimize withdrawal symptoms.    Buprenorphine/Naloxone can only be prescribed by certain physicians who have had training in the field of addictive disorders and are certified by the Drug Enforcement Agency ([employer]MUND). Buprenorphine/Naloxone is a controlled substance and it's use is regulated by the [place] Board of Medical Examiners, [place] [facility] board and the [employer].     It is important that you are compliant in taking Buprenorphine/Naloxone strictly as prescribed. You have signed an agreement and am aware of the the risk of physical dependency, tolerance or addition to Buprenorphine/Naloxone. You have been informed of the side effects associated with this medication including constipation. You have been informed that certain medications in the benzodiazepine class including diazepam, lorazepam, alprazolam and similar medications " "should not be taken with Buprenorphine/Naloxone. You are aware that withdrawal symptoms including yawning, sweating, watery eyes, runny nose, anxiety, tremors, aching muscles, hot and cold flashes, goose bumps, abdominal pain, diarrhea and depression can occur if you suddenly discontinue or reduce the dose you are prescribed.     You have been informed that this medication should not be taken during pregnancy and you will inform me if you do plan to become or become pregnant during treatment.     You have agreed not to obtain this or other controlled medications from other physicians while taking Buprenorphine/Naloxone. You understand that I will review a history of all controlled medications prescribed to you on the [facility] Prescription Monitoring Program Database and that you must report any other controlled medication use to your physician. You will not take opioid pain medications while taking Buprenorphine/Naloxone. If you have an emergency or acute pain crises you will inform me as soon as possible.     You will come for your regularly scheduled appointments which must be at least every 90 days. You may schedule an appointment early and I can prescribe your medication in advance with a "do not fill until" date. If you have not been evaluated in the past 90 days your prescription will not be renewed. You should schedule an appointment for your return visit before you leave the office today because if I am out of the office for vacation or other reasons there is no other physician that can prescribe this medication in my absence. If you schedule an appointment and I need to cancel that visit my office will contact you to make alternative arrangements.     You should not call for refills of this medications at night, weekends or Holidays without exception.     You have agreed not to use illegal drugs while taking Buprenorphine/Naloxone including marijuana. You understand that I can request a random " drug test at any time and that you have agreed to provide this sample without advanced notice. Failure to comply with this request could result in termination of your treatment with Buprenorphine/Naloxone.     You understand that lost, stolen, misplaced, spilled medications will not be replaced. It is your responsibility to safeguard your medication. You understand that your medication cannot be filled early for any reason including work schedule conflicts, family emergencies, travel and vacations. You also have agreed not to give or sell Buprenorphine/Naloxone to anyone.     You have agreed to take this medication under your tongue properly.     If your insurance requires a prior authorization for this medication it could result in a delay of authorization for payment of your medication. I advise that you obtain information from your insurance and pharmacist to determine if you will require a prior authorization as far in advance as possible.     It is strongly recommended that you seek outpatient substance abuse treatment in addition to medical management with Buprenorphine/Naloxone. If your insurance doesn't provide this benefit or if you can't afford counseling you can seek treatment with your local AA/NA chapter or with your [facility]. Many insurances require that you are actively in outpatient treatment before they will authorize payment for Buprenorphine/Naloxone.

## 2018-03-22 NOTE — PROGRESS NOTES
Subjective:       Patient ID: Jaime Hendesron is a 47 y.o. male.    Chief Complaint: Drug / Alcohol Assessment    HPI The patient presents for medical management of opioid dependency. he is receiving maintenance therapy with Zubsolv. he is doing well and denies any craving or relapses. he denies any alcohol or substance abuse issues. he has had significant improvements in his relationships, social and occupational functioning. he claims to be compliant with treatment. I have reviewed his profile on the [facility] board of Pharmacy Prescription monitoring program website and he appears to be compliant. his last refill for Zubsolv 5.7 mg # 90 was on February 22, 2018. The patient understands the risks, benefits and alternatives associated with the medical management of opioid dependency with Suboxone and has signed a Behavior and Pain Agreement for the use of Controlled Drugs.       He has work place related stress. 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 It is also a high risk and high stress environment.    Review of Systems   Constitutional: Negative.  Negative for activity change and unexpected weight change.   HENT: Negative for hearing loss, rhinorrhea and trouble swallowing.    Eyes: Negative for discharge and visual disturbance.   Respiratory: Negative.  Negative for chest tightness, shortness of breath and wheezing.    Cardiovascular: Negative for chest pain and palpitations.   Gastrointestinal: Negative for blood in stool, constipation, diarrhea and vomiting.   Endocrine: Negative for polydipsia and polyuria.   Genitourinary: Negative for difficulty urinating, hematuria and urgency.   Musculoskeletal: Negative for arthralgias, joint swelling and neck pain.   Neurological: Negative for weakness and headaches.   Psychiatric/Behavioral: Negative.  Negative for confusion and dysphoric mood.   All other systems reviewed and are negative.      Objective:      Vitals:    03/22/18 1022   BP:  120/80   Pulse: 78     Physical Exam   Constitutional: He is oriented to person, place, and time. He appears well-developed and well-nourished. He is cooperative. No distress.   HENT:   Head: Normocephalic and atraumatic.   Right Ear: Hearing, tympanic membrane, external ear and ear canal normal.   Left Ear: Hearing, external ear and ear canal normal.   Nose: Nose normal.   Mouth/Throat: Oropharynx is clear and moist.   Eyes: Conjunctivae are normal. Pupils are equal, round, and reactive to light.   Neck: Normal range of motion. Neck supple. No thyromegaly present.   Cardiovascular: Normal rate, regular rhythm, normal heart sounds and intact distal pulses.    Pulmonary/Chest: Effort normal and breath sounds normal. No respiratory distress.   Musculoskeletal: Normal range of motion. He exhibits no edema or tenderness.   Lymphadenopathy:     He has no cervical adenopathy.   Neurological: He is alert and oriented to person, place, and time. He has normal strength. Coordination and gait normal.   Skin: Skin is warm, dry and intact. No cyanosis. Nails show no clubbing.   Psychiatric: He has a normal mood and affect. His speech is normal and behavior is normal. Judgment and thought content normal. Cognition and memory are normal.   Vitals reviewed.      Assessment:       1. Opioid dependence on agonist therapy    2. Anxiety        Plan:       Opioid dependence on agonist therapy  -     buprenorphine-naloxone (ZUBSOLV) 5.7-1.4 mg Subl; Place 1 tablet under the tongue 3 (three) times daily. [id number]  Dispense: 90 tablet; Refill: 2    Anxiety    Other orders  -     DULoxetine (CYMBALTA) 60 MG capsule; Take 1 capsule (60 mg total) by mouth once daily.  Dispense: 30 capsule; Refill: 11      Follow-up in about 3 months (around 6/22/2018).

## 2018-04-24 ENCOUNTER — TELEPHONE (OUTPATIENT)
Dept: FAMILY MEDICINE | Facility: CLINIC | Age: 48
End: 2018-04-24

## 2018-04-24 NOTE — TELEPHONE ENCOUNTER
Left message to pt 11 refills were sent to [facility] for cymbalta and we do not have a doctor here that can write for Zubsolv; he will have to contact his insurance for providers, google search on the internet or return call if we can be of further assistance

## 2018-04-24 NOTE — TELEPHONE ENCOUNTER
----- Message from Juliette Mcnally sent at 4/24/2018 12:15 PM CDT -----  Contact: Kurt Cantrell, Replaced by [facility] at [facility], [phone]  Calling in regards to Cymbalta 60 mg and Zubsolv prescription refills. Please advise.

## 2018-04-24 NOTE — TELEPHONE ENCOUNTER
----- Message from Hoda Le sent at 4/24/2018  3:31 PM CDT -----  Contact: [facility]/[phone]  They are going to need a new prescription for the ULoxetine (CYMBALTA) 60 MG capsule since Dr. Frazier passed away they cannot do a refill.  Also, if you are not going to refill the buprenorphine-naloxone (ZUBSOLV) 5.7-1.4 mg Subl can you contact the patient at [phone].

## 2018-04-25 RX ORDER — DULOXETIN HYDROCHLORIDE 60 MG/1
60 CAPSULE, DELAYED RELEASE ORAL DAILY
Qty: 30 CAPSULE | Refills: 0 | Status: SHIPPED | OUTPATIENT
Start: 2018-04-25 | End: 2018-06-20 | Stop reason: SDUPTHER

## 2018-04-25 NOTE — TELEPHONE ENCOUNTER
----- Message from Mirlande Frederick sent at 4/25/2018  9:17 AM CDT -----  No. [phone]    Patient returned your call.

## 2018-04-26 ENCOUNTER — TELEPHONE (OUTPATIENT)
Dept: FAMILY MEDICINE | Facility: CLINIC | Age: 48
End: 2018-04-26

## 2018-06-18 ENCOUNTER — TELEPHONE (OUTPATIENT)
Dept: FAMILY MEDICINE | Facility: CLINIC | Age: 48
End: 2018-06-18

## 2018-06-18 NOTE — TELEPHONE ENCOUNTER
----- Message from Ludy Taveras sent at 6/18/2018 11:33 AM CDT -----  Contact: [phone]  Patient called in requesting to speak with you. Patient prefers to speak with a nurse. Please advise.

## 2018-06-20 ENCOUNTER — OFFICE VISIT (OUTPATIENT)
Dept: FAMILY MEDICINE | Facility: CLINIC | Age: 48
End: 2018-06-20
Payer: COMMERCIAL

## 2018-06-20 VITALS
WEIGHT: 219.88 LBS | OXYGEN SATURATION: 96 % | HEIGHT: 65 IN | DIASTOLIC BLOOD PRESSURE: 80 MMHG | SYSTOLIC BLOOD PRESSURE: 130 MMHG | BODY MASS INDEX: 36.64 KG/M2 | HEART RATE: 99 BPM

## 2018-06-20 DIAGNOSIS — F41.9 ANXIETY: ICD-10-CM

## 2018-06-20 DIAGNOSIS — F11.20 OPIOID DEPENDENCE ON AGONIST THERAPY: ICD-10-CM

## 2018-06-20 DIAGNOSIS — F11.21 OPIOID DEPENDENCE IN REMISSION: Primary | ICD-10-CM

## 2018-06-20 LAB
AMP D-AMPHETAMINE 1000 NG/ML: POSITIVE
BAR SECOBARBITAL 300 NG/ML: NEGATIVE
BUP BUPRENORPHINE 10 NG/ML: POSITIVE
BZO OXAZEPAM 300 NG/ML: NEGATIVE
COC BENZOYLECGONINE 300 NG/ML: NEGATIVE
CTP QC/QA: YES
MET D-METHAMPHETAMINE 500 NG/ML: NEGATIVE
MOP MORPHINE 300 NG/ML: NEGATIVE
MTD METHADONE 300 NG/ML: NEGATIVE
QXY OXYCODONE 100 NG/ML: NEGATIVE
THC 11-NOR-9-TETRAHYDROCANNABINOL-9-CARBOXYLIC ACID: NEGATIVE

## 2018-06-20 PROCEDURE — 99999 PR PBB SHADOW E&M-EST. PATIENT-LVL III: CPT | Mod: PBBFAC,,, | Performed by: FAMILY MEDICINE

## 2018-06-20 PROCEDURE — 99214 OFFICE O/P EST MOD 30 MIN: CPT | Mod: S$GLB,,, | Performed by: FAMILY MEDICINE

## 2018-06-20 PROCEDURE — 3008F BODY MASS INDEX DOCD: CPT | Mod: CPTII,S$GLB,, | Performed by: FAMILY MEDICINE

## 2018-06-20 PROCEDURE — 80305 DRUG TEST PRSMV DIR OPT OBS: CPT | Mod: QW,S$GLB,, | Performed by: FAMILY MEDICINE

## 2018-06-20 RX ORDER — DULOXETIN HYDROCHLORIDE 60 MG/1
60 CAPSULE, DELAYED RELEASE ORAL DAILY
Qty: 30 CAPSULE | Refills: 3 | Status: SHIPPED | OUTPATIENT
Start: 2018-06-20 | End: 2019-06-20

## 2018-06-20 NOTE — PROGRESS NOTES
Subjective:       Patient ID: Jaime Henderson is a 47 y.o. male.    Chief Complaint: Medication Refill    48 y/o  with long hx of Chronic LBP, secondary to Hood in back and chronic  opiod dependence and mnow on Zubsolve, with good pain control. Here for follow up. Pt states Zubsolve is helping him to control his pain and is functional ,denies any relapse or craving.  UDS shows positive for Bup and Amphetamine, was discussed with patient, states took one dose of Adipex from patient unknowingly that it is Amphetamine. Agrees not use any further  LAPMP reviewed, showing compliance        Review of Systems    Objective:      Physical Exam   Constitutional: He is oriented to person, place, and time. He appears well-developed and well-nourished. He is cooperative.  Non-toxic appearance. He does not appear ill. No distress.   HENT:   Head: Normocephalic and atraumatic.   Right Ear: Hearing, tympanic membrane, external ear and ear canal normal.   Left Ear: Hearing, tympanic membrane, external ear and ear canal normal.   Nose: Nose normal. No mucosal edema, rhinorrhea or nasal deformity. No epistaxis. Right sinus exhibits no maxillary sinus tenderness and no frontal sinus tenderness. Left sinus exhibits no maxillary sinus tenderness and no frontal sinus tenderness.   Mouth/Throat: Uvula is midline, oropharynx is clear and moist and mucous membranes are normal. No trismus in the jaw. Normal dentition. No uvula swelling. No oropharyngeal exudate or posterior oropharyngeal erythema.   Eyes: Conjunctivae and lids are normal. Right eye exhibits no discharge. Left eye exhibits no discharge. No scleral icterus.   Sclera clear bilat   Neck: Trachea normal, normal range of motion, full passive range of motion without pain and phonation normal. Neck supple. No thyromegaly present.   Cardiovascular: Normal rate, regular rhythm, normal heart sounds, intact distal pulses and normal pulses.    No murmur  heard.  Pulmonary/Chest: Effort normal and breath sounds normal. He has no wheezes. He has no rales.   Abdominal: Soft. Normal appearance and bowel sounds are normal. He exhibits no distension, no pulsatile midline mass and no mass. There is no tenderness.   Musculoskeletal: Normal range of motion. He exhibits no edema or deformity.   BACK: Old surgery scar  Non tender      Neurological: He is alert and oriented to person, place, and time. He exhibits normal muscle tone. Coordination normal.   Skin: Skin is warm, dry and intact. He is not diaphoretic. No pallor.   Psychiatric: He has a normal mood and affect. His speech is normal and behavior is normal. Judgment and thought content normal. Cognition and memory are normal.   Nursing note and vitals reviewed.      Assessment:       1. Opioid dependence in remission    2. Opioid dependence on agonist therapy    3. Anxiety        Plan:         Opiod depence on agonist Therapy  After lengthy discussion  With patients approval dose of Zubsolve was decreased to bid with instruction to plan to taper off gradually, which he agrees  Anxiety:  Refill of Cymbalta was prescribed since it helps control his nerves    Tobacco Abuse: Smoking Cessation was discussed, but no medication started at present/  LAPMP reviewed with good compliance  RTC in 3 months
